# Patient Record
Sex: FEMALE | Race: WHITE
[De-identification: names, ages, dates, MRNs, and addresses within clinical notes are randomized per-mention and may not be internally consistent; named-entity substitution may affect disease eponyms.]

---

## 2018-08-18 ENCOUNTER — HOSPITAL ENCOUNTER (EMERGENCY)
Dept: HOSPITAL 45 - C.EDC | Age: 58
Discharge: HOME | End: 2018-08-18
Payer: COMMERCIAL

## 2018-08-18 VITALS — DIASTOLIC BLOOD PRESSURE: 103 MMHG | SYSTOLIC BLOOD PRESSURE: 158 MMHG | OXYGEN SATURATION: 99 % | HEART RATE: 68 BPM

## 2018-08-18 VITALS — OXYGEN SATURATION: 99 % | TEMPERATURE: 98.24 F

## 2018-08-18 DIAGNOSIS — E03.9: ICD-10-CM

## 2018-08-18 DIAGNOSIS — Z87.891: ICD-10-CM

## 2018-08-18 DIAGNOSIS — N28.9: ICD-10-CM

## 2018-08-18 DIAGNOSIS — M79.7: ICD-10-CM

## 2018-08-18 DIAGNOSIS — Z79.899: ICD-10-CM

## 2018-08-18 DIAGNOSIS — M06.9: ICD-10-CM

## 2018-08-18 DIAGNOSIS — I10: ICD-10-CM

## 2018-08-18 DIAGNOSIS — R55: Primary | ICD-10-CM

## 2018-08-18 LAB
ALBUMIN SERPL-MCNC: 3.9 GM/DL (ref 3.4–5)
ALP SERPL-CCNC: 123 U/L (ref 45–117)
ALT SERPL-CCNC: 33 U/L (ref 12–78)
AST SERPL-CCNC: 32 U/L (ref 15–37)
BASOPHILS # BLD: 0.03 K/UL (ref 0–0.2)
BASOPHILS NFR BLD: 0.5 %
BUN SERPL-MCNC: 16 MG/DL (ref 7–18)
BUN SERPL-MCNC: 18 MG/DL (ref 7–18)
CALCIUM SERPL-MCNC: 8.3 MG/DL (ref 8.5–10.1)
CALCIUM SERPL-MCNC: 9 MG/DL (ref 8.5–10.1)
CO2 SERPL-SCNC: 25 MMOL/L (ref 21–32)
CO2 SERPL-SCNC: 25 MMOL/L (ref 21–32)
CREAT SERPL-MCNC: 1.46 MG/DL (ref 0.6–1.2)
CREAT SERPL-MCNC: 2.34 MG/DL (ref 0.6–1.2)
EOS ABS #: 0.06 K/UL (ref 0–0.5)
EOSINOPHIL NFR BLD AUTO: 201 K/UL (ref 130–400)
GLUCOSE SERPL-MCNC: 102 MG/DL (ref 70–99)
GLUCOSE SERPL-MCNC: 181 MG/DL (ref 70–99)
HCT VFR BLD CALC: 39.2 % (ref 37–47)
HGB BLD-MCNC: 12.6 G/DL (ref 12–16)
IG#: 0.02 K/UL (ref 0–0.02)
IMM GRANULOCYTES NFR BLD AUTO: 21.2 %
INR PPP: 1 (ref 0.9–1.1)
LYMPHOCYTES # BLD: 1.4 K/UL (ref 1.2–3.4)
MCH RBC QN AUTO: 27.5 PG (ref 25–34)
MCHC RBC AUTO-ENTMCNC: 32.1 G/DL (ref 32–36)
MCV RBC AUTO: 85.4 FL (ref 80–100)
MONO ABS #: 0.34 K/UL (ref 0.11–0.59)
MONOCYTES NFR BLD: 5.2 %
NEUT ABS #: 4.75 K/UL (ref 1.4–6.5)
NEUTROPHILS # BLD AUTO: 0.9 %
NEUTROPHILS NFR BLD AUTO: 71.9 %
PMV BLD AUTO: 11.7 FL (ref 7.4–10.4)
POTASSIUM SERPL-SCNC: 4 MMOL/L (ref 3.5–5.1)
POTASSIUM SERPL-SCNC: 4.7 MMOL/L (ref 3.5–5.1)
PROT SERPL-MCNC: 8.3 GM/DL (ref 6.4–8.2)
PTT PATIENT: 24.8 SECONDS (ref 21–31)
RED CELL DISTRIBUTION WIDTH CV: 15.4 % (ref 11.5–14.5)
RED CELL DISTRIBUTION WIDTH SD: 47.6 FL (ref 36.4–46.3)
SODIUM SERPL-SCNC: 137 MMOL/L (ref 136–145)
SODIUM SERPL-SCNC: 140 MMOL/L (ref 136–145)
WBC # BLD AUTO: 6.6 K/UL (ref 4.8–10.8)

## 2018-08-18 NOTE — INTERNAL MEDICINE CONSULTATION
DATE OF CONSULTATION:  08/18/2018

 

IM ATTENDING :



Patient seen and examined at the request of Dr. Drake for evaluation of 
possible syncope.  

History obtained from the patient, , and records.

Preceding documentation by Miss Hope Campa PA-C reviewed.  





In addition, patient refuses to stay for observation/admission for evaluation 
of syncope.





FINAL ASSESSMENT AND RECOMMENDATIONS as follows :



Syncope likely secondary to orthostasis

ARF, clinical dehydration

rule out arrhythmia, obstructive cardiac pathology

given conduction blocks and hx nonobstructive CAD as per patient history 





PCU

telemetry monitoring

2D echo RE syncope

monitor creatinine to response to IVF

hold home ARB for now.  



Patient's unwillingness to stay for observation relayed to [place], Dr. Drake.

Final patient disposition as per Dr. Drake.



 

 

 

[facility]

## 2018-08-18 NOTE — HISTORY AND PHYSICAL
History & Physical


Date & Time of Service:


Aug 18, 2018 at 21:50


Chief Complaint:


Syncope


Primary Care Physician:


No Doctor, Assigned


History of Present Illness


Source:  patient, family, clinic records, hospital records


Pt is 57 y/o F with PMH HTN, hypothyroidism, RA, fibromyalgia, osteoarthritis 

presented to ER with complaint of near syncope.  Patient states today was at 

[facility] and was hot and sweating a lot all day.  She states she was walking 

when she became feeling increasingly hot, lightheaded, felt nauseated and felt 

like her vision was going black.  Patient reports that she was able to sit down 

on the ground and afterwards was starting to feel better.  Patient states was 

evaluated at first aid station.  Patient family member with her today and 

states that "maybe she passed out for 1 second" but is not sure. Patient states 

drink 2 bottles of water while there. She reports ate peanut butter and jelly 

sandwich for breakfast and eat pulled pork sandwich for lunch. Patient reports 

history of near syncope events in the past while attending a car showed during 

the summer. Patient denies any history of TIA/CVA, MI.  She reports history of 

cardiac cath and had 20% blockage of unknown artery.  Patient states had recent 

cardiac cath she believes within the past month and was told had less than 20% 

blockage. Denies fever/chills, diaphoresis, V/D/C, HA, neck pain, CP, SOB, 

orthopnea, palpitations, cough, sore throat, choking, otalgia, rhinorrhea, 

abdominal pain, paresthesias, extremity edema, rashes, urinary symptoms, weight 

loss.  Patient denies any known history kidney problems.





Patient states symptoms have completely resolved and she is anxious to go home 

if able.





Past Medical/Surgical History


Medical Problems:


(1) Fibromyalgia


Status: Chronic  





(2) HTN (hypertension)


Status: Chronic  





(3) Hypothyroidism


Status: Chronic  





(4) Rheumatoid arthritis


Status: Chronic  





Surgical Problems:


(1) History of hysterectomy


Status: Resolved  





(2) History of left knee replacement


Status: Resolved  








Family History





Diabetes mellitus


FH: cancer


Hypertension





Social History


Smoking Status:  Former Smoker (Quit 1994, had smoked 1 pack per day 10 years.)


Smokeless Tobacco Use:  No


Alcohol Use:  none


Drug Use:  none





Allergies


Coded Allergies:  


     No Known Allergies (Unverified , 8/18/18)





Home Medications


Scheduled


Cyanocobalamin (Vitamin B-12), MCG PO DAILY


Folic Acid (Folvite), 1 MG PO WK


Gabapentin (Neurontin), 300 MG PO TID


Losartan Potassium (Cozaar), Unknown Dose PO DAILY


Meloxicam (Mobic), Unknown Dose PO DAILY


Methotrexate (Methotrexate), 8 TBS PO WK


Sertraline (Zoloft), 25 MG PO DAILY





Review of Systems


See HPI for pertinent positives & negatives. All other systems reviewed and 

were otherwise negative





Physical Exam


Vital Signs











  Date Time  Temp Pulse Resp B/P (MAP) Pulse Ox O2 Delivery O2 Flow Rate FiO2


 


8/18/18 21:16  61 18 155/98 98 Room Air  


 


8/18/18 18:48  62 14 134/90 98 Room Air  


 


8/18/18 18:22  66  133/82    





  69  115/96    





  76  138/81    


 


8/18/18 17:37 36.8 78 20 133/81 98 Room Air  


 


8/18/18 17:37     99 Room Air  








General Appearance:  no apparent distress, + obese


Head:  normocephalic, atraumatic


Eyes:  normal inspection, PERRL, EOMI, sclerae normal


ENT:  hearing grossly normal, pharynx normal, + pertinent finding (Mucous 

membranes moist)


Neck:  supple, trachea midline


Respiratory/Chest:  lungs clear, normal breath sounds, no respiratory distress


Cardiovascular:  regular rate, rhythm


Abdomen/GI:  normal bowel sounds, non tender, soft


Back:  no CVA tenderness


Extremities/Musculoskelatal:  no calf tenderness, normal capillary refill, no 

pedal edema, normal range of motion


Neurologic/Psych:  alert, normal mood/affect, oriented x 3


Skin:  warm/dry





Diagnostics


Laboratory Results





Results Past 24 Hours








Test


  8/18/18


17:05 8/18/18


18:08 8/18/18


18:09 8/18/18


20:04 Range/Units


 


 


White Blood Count 6.60    4.8-10.8  K/uL


 


Red Blood Count 4.59    4.2-5.4  M/uL


 


Hemoglobin 12.6    12.0-16.0  g/dL


 


Hematocrit 39.2    37-47  %


 


Mean Corpuscular Volume 85.4      fL


 


Mean Corpuscular Hemoglobin 27.5    25-34  pg


 


Mean Corpuscular Hemoglobin


Concent 32.1


  


  


  


  32-36  g/dl


 


 


Platelet Count 201    130-400  K/uL


 


Mean Platelet Volume 11.7    7.4-10.4  fL


 


Neutrophils (%) (Auto) 71.9     %


 


Lymphocytes (%) (Auto) 21.2     %


 


Monocytes (%) (Auto) 5.2     %


 


Eosinophils (%) (Auto) 0.9     %


 


Basophils (%) (Auto) 0.5     %


 


Neutrophils # (Auto) 4.75    1.4-6.5  K/uL


 


Lymphocytes # (Auto) 1.40    1.2-3.4  K/uL


 


Monocytes # (Auto) 0.34    0.11-0.59  K/uL


 


Eosinophils # (Auto) 0.06    0-0.5  K/uL


 


Basophils # (Auto) 0.03    0-0.2  K/uL


 


RDW Standard Deviation 47.6    36.4-46.3  fL


 


RDW Coefficient of Variation 15.4    11.5-14.5  %


 


Immature Granulocyte % (Auto) 0.3     %


 


Immature Granulocyte # (Auto) 0.02    0.00-0.02  K/uL


 


Prothrombin Time


  10.2


  


  


  


  9.0-12.0


SECONDS


 


Prothromb Time International


Ratio 1.0


  


  


  


  0.9-1.1  


 


 


Activated Partial


Thromboplast Time 24.8


  


  


  


  21.0-31.0


SECONDS


 


Partial Thromboplastin Ratio 1.0     


 


Sodium Level 137    136-145  mmol/L


 


Potassium Level 4.0    3.5-5.1  mmol/L


 


Chloride Level 102      mmol/L


 


Carbon Dioxide Level 25    21-32  mmol/L


 


Anion Gap 10.0    3-11  mmol/L


 


Blood Urea Nitrogen 18    7-18  mg/dl


 


Creatinine


  2.34


  


  


  


  0.60-1.20


mg/dl


 


Estimated GFR (


American) 25.7


  


  


  


   


 


 


Estimated GFR (Non-


American 22.2


  


  


  


   


 


 


BUN/Creatinine Ratio 7.5    10-20  


 


Random Glucose 181    70-99  mg/dl


 


Calcium Level 9.0    8.5-10.1  mg/dl


 


Total Bilirubin 0.6    0.2-1  mg/dl


 


Direct Bilirubin 0.1    0-0.2  mg/dl


 


Aspartate Amino Transf


(AST/SGOT) 32


  


  


  


  15-37  U/L


 


 


Alanine Aminotransferase


(ALT/SGPT) 33


  


  


  


  12-78  U/L


 


 


Alkaline Phosphatase 123      U/L


 


Troponin I < 0.015   < 0.015 0-0.045  ng/ml


 


Total Protein 8.3    6.4-8.2  gm/dl


 


Albumin 3.9    3.4-5.0  gm/dl


 


Thyroid Stimulating Hormone


(TSH) 0.964


  


  


  


  0.300-4.500


uIu/ml


 


Urine Color  DK YELLOW    


 


Urine Appearance  CLEAR   CLEAR  


 


Urine pH  6.0   4.5-7.5  


 


Urine Specific Gravity  1.022   1.000-1.030  


 


Urine Protein  TRACE   NEG  


 


Urine Glucose (UA)  NEG   NEG  


 


Urine Ketones  TRACE   NEG  


 


Urine Occult Blood  NEG   NEG  


 


Urine Nitrite  NEG   NEG  


 


Urine Bilirubin  NEG   NEG  


 


Urine Urobilinogen  NEG   NEG  


 


Urine Leukocyte Esterase  SMALL   NEG  


 


Urine WBC (Auto)  5-10   0-5  /hpf


 


Urine RBC (Auto)  5-10   0-4  /hpf


 


Urine Hyaline Casts (Auto)  >30   0-5  /lpf


 


Urine Epithelial Cells (Auto)  >30   0-5  /lpf


 


Urine Bacteria (Auto)  NEG   NEG  


 


Urine Renal Epithelial Cells  0-5   0-5  /lpf


 


Urine Crystals  CALCIUM OXALATE   NONE PRSENT  


 


Urine Pathogenic Casts     0  /lpf


 


Bedside Glucose   93  70-90  mg/dl


 


Test


  8/18/18


21:27 


  


  


  Range/Units


 








Microbiology Results


8/18/18 Urine Culture, Received


          Pending





Diagnostic Radiology


CXR:


IMPRESSION:


1.  No acute cardiopulmonary disease.





CT head:


IMPRESSION:


1.  No acute intracranial abnormality.





EKG


EKG: Sinus rhythm, first-degree AV block, RBBB





Impression


Assessment and Plan


Pt is 57 y/o F with PMH HTN, hypothyroidism, RA, fibromyalgia, osteoarthritis 

presented to ER with complaint of near syncope. Pt was at [place] and was 

hot and sweating a lot all day.  She states she was walking when she became 

increasingly hot, lightheaded, felt nauseated and felt like her vision was 

going black.  Patient reports that she was able to sit down on the ground and 

afterwards was starting to feel better.  Patient family member with her today 

and states that "maybe she passed out for 1 second" but is not sure





NEAR-SYNCOPE


POSSIBLE SYNCOPE


Probable vasovagal


Denies CP/SOB/palpitations


In ER patient afebrile, pulse 78, /81, 98% on room air.  No leukocytosis.

  Hgb: 12.6.  Negative initial troponin and negative troponin 3 hours after.  UA

: Trace ketones, sm leuk, 5-10 WBC and RBC,> 30 hyaline casts, > epithelial


-Given 1 L NSS in ER


-Patient reports feeling much better


-Pending urine culture





RENAL INSUFFICIENCY


Cr: 2.34.  GFR: 22.  Unsure of patient's baseline renal functions


-Monitor renal functions


-Avoid nephrotoxic agents when able





HTN


Patient on losartan, reports taking this morning





HYPOTHYROIDISM


TSH: 0.9


-Patient reports is on thyroid medicine, unsure of name and dose.  Unable to 

call pharmacy at this time secondary to pharmacy being closed





RHEUMATOID ARTHRITIS


-On methotrexate, Mobic





FIBROMYALGIA


-On gabapentin, Zoloft





Patient reports PCP was Dr. Quintero in [place], however reports retired and hasn

't seen new PCP yet





-Repeat renal function pending at this time.  Patient reports is feeling much 

better and would really like to be discharged home tonight if her renal 

functions are improving however does report that she would be willing to stay 

if appropriate.





Patient seen with Dr. Mann.  Please see attending addendum for further 

assessment and plan and disposition.





Resuscitation Status








VTE Prophylaxis


Will order VTE Prophylaxis:  Yes





Assessment/Plan


IM ATTENDING :





Patient seen and examined at the request of Dr. Drake for evaluation of 

possible syncope.  


History obtained from the patient, , and records.


Preceding documentation by Miss Hope Campa PA-C reviewed.  








In addition, patient refuses to stay for observation/admission for evaluation 

of syncope.








FINAL ASSESSMENT AND RECOMMENDATIONS as follows :





Syncope likely secondary to orthostasis


ARF, clinical dehydration


rule out arrhythmia, obstructive cardiac pathology


given conduction delay (1AVB, RBBB) on EKG and hx nonobstructive CAD as per 

patient history 








PCU


telemetry monitoring


2D echo RE syncope


monitor creatinine to response to IVF


hold home ARB for now.  





Patient's unwillingness to stay for observation relayed to Dr. Vidal CUEVAS.


Final patient disposition as per Dr. Drake.

## 2018-08-18 NOTE — DIAGNOSTIC IMAGING REPORT
CHEST ONE VIEW PORTABLE



CLINICAL HISTORY: 58 years-old Female presenting with EVALUATE ALTERED MENTAL

STATUS/WEAKNESS. 



TECHNIQUE: Portable upright AP view of the chest was obtained.



COMPARISON: None.



FINDINGS:

Cardiomediastinal silhouette normal. Lungs and pleural spaces clear. Osseous

structures normal. Upper abdomen normal.



IMPRESSION:

1.  No acute cardiopulmonary disease.







Electronically signed by:  Phil Barnes M.D.

8/18/2018 6:15 PM



Dictated Date/Time:  8/18/2018 6:15 PM

## 2018-08-18 NOTE — EMERGENCY ROOM VISIT NOTE
History


Report prepared by Kali:  Kathleen Barajas


Under the Supervision of:  Dr. Félix Drake D.O.


First contact with patient:  17:38


Stated Complaint:  SYNCOPE





History of Present Illness


The patient is a 58 year old female who presents to the Emergency Room with 

complaints of an episode of near syncope occurring prior to arrival. The 

patient states that she was at the [employer] looking at the show animals when 

she started to feel lightheaded. She states that she started sweating, becoming 

pale, and her vision started to get dark. She reports that she felt like she 

was going to pass out so she sat down on the ground. She states that she had an 

episode of nausea that almost immediately went away. She reports that her 

daughter got help and she was taken to the first aid station. The patient 

states that this has happened once before when she was out in the heat. She 

states that she tends to get dehydrated easily, but notes that she had drank 2 

bottles of water while she was there. The patient notes that she feels back to 

normal. The patient denies vomiting, abdominal pain, chest pain, shortness of 

breath, weakness, numbness, loss of consciousness, hitting her head, recent 

trips, and history of blood clots.





   Source of History:  patient


   Onset:  prior to arrival


   Position:  head


   Quality:  other (near syncope)


   Timing:  other (episode)


   Associated Symptoms:  + diaphoresis, + nausea, No LOC, No chest pain, No SOB

, No vomiting, No weakness, No numbness


Note:


The patient complains of lightheadedness, changes in vision, and becoming pale. 

The patient denies hitting her head and recent trips.





Review of Systems


See HPI for pertinent positives & negatives. A total of 10 systems reviewed and 

were otherwise negative.





Past Medical & Surgical


Medical Problems:


(1) Fibromyalgia


(2) HTN (hypertension)


(3) Hypothyroidism


(4) Rheumatoid arthritis


Surgical Problems:


(1) History of hysterectomy


(2) History of left knee replacement


(3) Hx of hysterectomy








Family History





Diabetes mellitus


Hypertension





Social History


Marital Status:  single


Housing Status:  lives with family





Current/Historical Medications


Scheduled


Cyanocobalamin (Vitamin B-12), MCG PO DAILY


Folic Acid (Folvite), 1 MG PO WK


Gabapentin (Neurontin), 300 MG PO TID


Losartan Potassium (Cozaar), Unknown Dose PO DAILY


Meloxicam (Mobic), Unknown Dose PO DAILY


Methotrexate (Methotrexate), 8 TBS PO WK


Sertraline (Zoloft), 25 MG PO DAILY





Allergies


Coded Allergies:  


     No Known Allergies (Unverified , 8/18/18)





Physical Exam


Vital Signs











  Date Time  Temp Pulse Resp B/P (MAP) Pulse Ox O2 Delivery O2 Flow Rate FiO2


 


8/18/18 22:35  68 18 158/103 99   


 


8/18/18 21:16  61 18 155/98 98 Room Air  


 


8/18/18 18:48  62 14 134/90 98 Room Air  


 


8/18/18 18:22  66  133/82    





  69  115/96    





  76  138/81    


 


8/18/18 17:37 36.8 78 20 133/81 98 Room Air  


 


8/18/18 17:37     99 Room Air  











Physical Exam


GENERAL: Sitting up in bed, alert, well appearing, well nourished, no distress, 

non-toxic 


EYE EXAM: normal conjunctiva. 


OROPHARYNX: no exudate, no erythema, lips, buccal mucosa, and tongue normal and 

mucous membranes are moist


NECK: supple, no nuchal rigidity, no adenopathy, non-tender


LUNGS: Clear to auscultation. Normal chest wall mechanics


HEART: no murmurs, S1 normal and S2 normal 


ABDOMEN: abdomen soft, non-tender, normo-active bowel sounds, no masses, no 

rebound or guarding. 


BACK: Back is symmetrical on inspection and there is no deformity, no midline 

tenderness, no CVA tenderness. 


SKIN: no rashes and no bruising 


UPPER EXTREMITIES: upper extremities are grossly normal. 


LOWER EXTREMITIES: No pitting edema. 


NEURO EXAM: Normal sensorium, cranial nerves II-XII intact, normal speech,  no 

weakness of arms, no weakness of legs. No drift. Finger to nose intact. Gross 

sensation intact.





Medical Decision & Procedures


ER Provider


Diagnostic Interpretation:


Radiology results as stated below per my review and the radiologist's 

interpretation: 





CHEST ONE VIEW PORTABLE





CLINICAL HISTORY: 58 years-old Female presenting with EVALUATE ALTERED MENTAL


STATUS/WEAKNESS. 





TECHNIQUE: Portable upright AP view of the chest was obtained.





COMPARISON: None.





FINDINGS:


Cardiomediastinal silhouette normal. Lungs and pleural spaces clear. Osseous


structures normal. Upper abdomen normal.





IMPRESSION:


1.  No acute cardiopulmonary disease.











Electronically signed by:  Phil Barnes M.D.


8/18/2018 6:15 PM





Dictated Date/Time:  8/18/2018 6:15 PM





HEAD WITHOUT CONTRAST (CT)





CLINICAL HISTORY: 58 years-old Female presenting with dizzy/near syncope. 





TECHNIQUE: Multidetector CT imaging of the head was performed without the use of


intravenous contrast. IV contrast: None. A dose lowering technique was used


consistent with the principles of ALARA (as low as reasonably achievable). 





COMPARISON: None.





CT DOSE (mGy.cm): The estimated cumulative dose is 537.48 mGy.cm.





FINDINGS: 





 topogram: Unremarkable.





Ventricles and sulci normal in size. Brain parenchyma normal in appearance with


preserved gray-white differentiation. No mass effect or midline shift. No


hemorrhage or acute territorial infarct. No extra-axial fluid collection.


Paranasal sinuses and mastoid air cells clear. Calvarium intact.    





IMPRESSION:


1.  No acute intracranial abnormality. 











Electronically signed by:  Phil Barnes M.D.


8/18/2018 6:41 PM





Dictated Date/Time:  8/18/2018 6:39 PM





Laboratory Results


8/18/18 17:05








Red Blood Count 4.59, Mean Corpuscular Volume 85.4, Mean Corpuscular Hemoglobin 

27.5, Mean Corpuscular Hemoglobin Concent 32.1, Mean Platelet Volume 11.7, 

Neutrophils (%) (Auto) 71.9, Lymphocytes (%) (Auto) 21.2, Monocytes (%) (Auto) 

5.2, Eosinophils (%) (Auto) 0.9, Basophils (%) (Auto) 0.5, Neutrophils # (Auto) 

4.75, Lymphocytes # (Auto) 1.40, Monocytes # (Auto) 0.34, Eosinophils # (Auto) 

0.06, Basophils # (Auto) 0.03





8/18/18 21:27

















Test


  8/18/18


17:05 8/18/18


18:08 8/18/18


18:09 8/18/18


20:04


 


White Blood Count


  6.60 K/uL


(4.8-10.8) 


  


  


 


 


Red Blood Count


  4.59 M/uL


(4.2-5.4) 


  


  


 


 


Hemoglobin


  12.6 g/dL


(12.0-16.0) 


  


  


 


 


Hematocrit 39.2 % (37-47)    


 


Mean Corpuscular Volume


  85.4 fL


() 


  


  


 


 


Mean Corpuscular Hemoglobin


  27.5 pg


(25-34) 


  


  


 


 


Mean Corpuscular Hemoglobin


Concent 32.1 g/dl


(32-36) 


  


  


 


 


Platelet Count


  201 K/uL


(130-400) 


  


  


 


 


Mean Platelet Volume


  11.7 fL


(7.4-10.4) 


  


  


 


 


Neutrophils (%) (Auto) 71.9 %    


 


Lymphocytes (%) (Auto) 21.2 %    


 


Monocytes (%) (Auto) 5.2 %    


 


Eosinophils (%) (Auto) 0.9 %    


 


Basophils (%) (Auto) 0.5 %    


 


Neutrophils # (Auto)


  4.75 K/uL


(1.4-6.5) 


  


  


 


 


Lymphocytes # (Auto)


  1.40 K/uL


(1.2-3.4) 


  


  


 


 


Monocytes # (Auto)


  0.34 K/uL


(0.11-0.59) 


  


  


 


 


Eosinophils # (Auto)


  0.06 K/uL


(0-0.5) 


  


  


 


 


Basophils # (Auto)


  0.03 K/uL


(0-0.2) 


  


  


 


 


RDW Standard Deviation


  47.6 fL


(36.4-46.3) 


  


  


 


 


RDW Coefficient of Variation


  15.4 %


(11.5-14.5) 


  


  


 


 


Immature Granulocyte % (Auto) 0.3 %    


 


Immature Granulocyte # (Auto)


  0.02 K/uL


(0.00-0.02) 


  


  


 


 


Prothrombin Time


  10.2 SECONDS


(9.0-12.0) 


  


  


 


 


Prothromb Time International


Ratio 1.0 (0.9-1.1) 


  


  


  


 


 


Activated Partial


Thromboplast Time 24.8 SECONDS


(21.0-31.0) 


  


  


 


 


Partial Thromboplastin Ratio 1.0    


 


Total Bilirubin


  0.6 mg/dl


(0.2-1) 


  


  


 


 


Direct Bilirubin


  0.1 mg/dl


(0-0.2) 


  


  


 


 


Aspartate Amino Transf


(AST/SGOT) 32 U/L (15-37) 


  


  


  


 


 


Alanine Aminotransferase


(ALT/SGPT) 33 U/L (12-78) 


  


  


  


 


 


Alkaline Phosphatase


  123 U/L


() 


  


  


 


 


Total Protein


  8.3 gm/dl


(6.4-8.2) 


  


  


 


 


Albumin


  3.9 gm/dl


(3.4-5.0) 


  


  


 


 


Thyroid Stimulating Hormone


(TSH) 0.964 uIu/ml


(0.300-4.500) 


  


  


 


 


Urine Color  DK YELLOW   


 


Urine Appearance  CLEAR (CLEAR)   


 


Urine pH  6.0 (4.5-7.5)   


 


Urine Specific Gravity


  


  1.022


(1.000-1.030) 


  


 


 


Urine Protein  TRACE (NEG)   


 


Urine Glucose (UA)  NEG (NEG)   


 


Urine Ketones  TRACE (NEG)   


 


Urine Occult Blood  NEG (NEG)   


 


Urine Nitrite  NEG (NEG)   


 


Urine Bilirubin  NEG (NEG)   


 


Urine Urobilinogen  NEG (NEG)   


 


Urine Leukocyte Esterase  SMALL (NEG)   


 


Urine WBC (Auto)


  


  5-10 /hpf


(0-5) 


  


 


 


Urine RBC (Auto)


  


  5-10 /hpf


(0-4) 


  


 


 


Urine Hyaline Casts (Auto)  >30 /lpf (0-5)   


 


Urine Epithelial Cells (Auto)  >30 /lpf (0-5)   


 


Urine Bacteria (Auto)  NEG (NEG)   


 


Urine Renal Epithelial Cells  0-5 /lpf (0-5)   


 


Urine Crystals


  


  CALCIUM


OXALATE (NONE 


  


 


 


Urine Pathogenic Casts   /lpf (0)   


 


Bedside Glucose


  


  


  93 mg/dl


(70-90) 


 


 


Troponin I


  


  


  


  < 0.015 ng/ml


(0-0.045)


 


Test


  8/18/18


21:27 


  


  


 


 


Anion Gap


  5.0 mmol/L


(3-11) 


  


  


 


 


Estimated GFR (


American) 45.5 


  


  


  


 


 


Estimated GFR (Non-


American 39.3 


  


  


  


 


 


BUN/Creatinine Ratio 11.0 (10-20)    


 


Calcium Level


  8.3 mg/dl


(8.5-10.1) 


  


  


 


 


Magnesium Level


  2.3 mg/dl


(1.8-2.4) 


  


  


 





Laboratory results per my review.





Medications Administered











 Medications


  (Trade)  Dose


 Ordered  Sig/Vinny


 Route  Start Time


 Stop Time Status Last Admin


Dose Admin


 


 Sodium Chloride  1,000 ml @ 


 999 mls/hr  Q1H1M STAT


 IV  8/18/18 17:46


 8/18/18 18:46 DC 8/18/18 17:46


999 MLS/HR











ECG Per My Interpretation


Indication:  syncope


Rate (beats per minute):  69


Rhythm:  sinus rhythm


Findings:  1st degree AV block, RBBB, T-wave inversion (Septal, Anterior, 

Inferior)


Comparison ECG Date:  no prior available





ED Course


ED COURSE: 


Vital signs were reviewed and showed that they were normal.


The patients medical record was reviewed


The above diagnostic studies were performed and reviewed.


ED treatments and interventions as stated above. 





1740: The patient was evaluated in room C6. A complete history and physical 

examination was performed.





1746: Ordered NSS 1000 ml @ 999 mls/hr IV. 





1811: I reevaluated the patient and she is doing well.





1950: I reevaluated the patient and she is doing well. She feels better. She is 

up moving around and eating.





2104: Upon reevaluation, the patient is resting comfortably. I discussed my 

findings with the patient and she understands and agrees with the treatment 

plan.   


Based on the patients age, coexisting illnesses, exam and lab findings the 

decision to treat as an inpatient was made.


The patient remained stable while under my care.


The patient will be evaluated for further management.





2122: I reviewed the patient's case with Dr. Qasim Hernandez Hospitalist.  He 

will evaluate the patient for further management.





2212: I reevalauted the patient and she decided that she would not like to stay 

for further evaluation. I explained the risks and benefits to the patient. She 

verbally expressed understanding. The patient is signing out AMA.





Medical Decision


Differential diagnosis includes etiologies such as vasovagal event, infection, 

hypoglycemia, electrolyte abnormalities, cardiac sources, intracerebral event, 

toxicologic, neurologic, as well as others were entertained.





Patient is a 58-year-old female that presents the ER for near syncopal episode 

at the [facility].  She notes that she has not been drinking as much fluids as 

she normally does.  Patient has no other complaints.  She denies any chest pain 

or shortness of breath.  She is neurologically intact.  CBC was unremarkable.  

BMP with creatinine 2.3.  Troponin was negative.  LFTs and bilirubin were 

normal.  EKG showed right bundle with first-degree AV block.  With the syncopal 

events, and elevated creatinine at 2.3 did recommend admission.  Patient was 

initially agreeable.  Discussed with Dr. Ji.  Dr. Ji evaluate the 

patient.  He declined admission.  She request to go home.  Risk and benefits 

were explained at length.   was at bedside and notes that he cannot make 

her stay.  She notes she wants to go home sleep in her own bed and getting good 

progress.  She does not believe that this is her heart.  Kidney function did 

improve with BMP.  Patient was discharged following informed refusal of care to 

follow-up with PCP as an outpatient.  I again strongly encouraged her to return 

to the ER if anything changes or worsens. Discussed with Pt concerning signs 

and symptoms to watch out for. Pt was instructed to follow up with their PCP 

and discussed with the patient their option to return to the ED at anytime for 

persistent or worsening symptoms. The appropriate anticipatory guidance and out-

patient management, including indications for return to the emergency department

, were explained at length to the patient and understood.





Medication Reconcilliation


Current Medication List:  was personally reviewed by me





Blood Pressure Screening


Patient's blood pressure:  Normal blood pressure


Blood pressure disposition:  Did not require urgent referral





Consults


Time Called:  2118


Consulting Physician:  Dr. Qasim Hernandez Hospitalist


Returned Call:  2122


I reviewed the patient's case with Dr. Qasim Hernandez Hospitalist.  He will 

evaluate the patient for further management.





Impression





 Primary Impression:  


 JUAN (acute kidney injury)


 Additional Impressions:  


 Dehydration


 Near syncope


 Acute electrocardiogram changes





Scribe Attestation


The scribe's documentation has been prepared under my direction and personally 

reviewed by me in its entirety. I confirm that the note above accurately 

reflects all work, treatment, procedures, and medical decision making performed 

by me.





Departure Information


Dispostion


Against Medical Advice





Forms


HOME CARE DOCUMENTATION FORM,                                                 

               IMPORTANT VISIT INFORMATION





Additional Instructions





Please follow up with your primary care doctor with in the next 24 hours.  Any 

worsening of your symptoms, please return to the ED immediately. This includes 

any fevers greater than 100.4, worsening pain, chest pain, shortness breath, 

persistent nausea, vomiting, unable to eat or drink, or any other concerning 

signs or symptoms from your standpoint.





You must follow-up with your primary care doctor in the next 24-48 hours for 

repeat blood work in regards to your kidney function as it was elevated in the 

ER.





You should also follow-up with your primary care doctor in regards to the 

syncope/near syncope in regards to your EKG findings.





Problem Qualifiers

## 2018-08-18 NOTE — MEDICAL CONSULT
Consultation Note


Date of Service


Aug 18, 2018.





Consultation Note


[place] [address]


 


 INTERNAL MEDICINE CONSULTATION








Patient Name:  JAZMINE RIZO Admit Date:  08/18/18


Med Rec:  [id number] Att Phy:  


Acct ID:  [id number] Melissa Phy:  No Doctor, Assigned


YOB: 1960 Fam Phy:  No Doctor, Assigned


Age:  58 Location:  [facility]


Sex:  F Room/Bed: 





CC:  No Doctor, Assigned


Dev Mann M.D. Schreckengost, Janea ., SHRUTHI


*NOTICE TO RECEIVING PARTY/AGENCY**  This information is strictly Confidential 

and protected under [place] law.  [place] law prohibits you from 

making any further disclosure of this information unless further disclosure is 

expressly permitted by the written consent of the person to whom it pertains or 

is authorized by law.  A general authorization for the release of medical or 

other information is not sufficient for this purpose.  Hospital accepts no 

responsibility if the information is made available to any other person, 

INCLUDING THE PATIENT.


History & Physical


Date & Time of Service:


Aug 18, 2018 at 21:50


Chief Complaint:


Syncope


Primary Care Physician:


No Doctor, Assigned





Patient seen on the request of Dr. Drake for evaluation of possible syncope.  





History of Present Illness


Source:  patient, family, clinic records, hospital records


Pt is 59 y/o F with PMH HTN, hypothyroidism, RA, fibromyalgia, osteoarthritis 

presented to ER with complaint of near syncope.  Patient states today was at 

[facility] and was hot and sweating a lot all day.  She states she was walking 

when she became feeling increasingly hot, lightheaded, felt nauseated and felt 

like her vision was going black.  Patient reports that she was able to sit down 

on the ground and afterwards was starting to feel better.  Patient states was 

evaluated at first aid station.  Patient family member with her today and 

states that "maybe she passed out for 1 second" but is not sure. Patient states 

drink 2 bottles of water while there. She reports ate peanut butter and jelly 

sandwich for breakfast and eat pulled pork sandwich for lunch. Patient reports 

history of near syncope events in the past while attending a car showed during 

the summer. Patient denies any history of TIA/CVA, MI.  She reports history of 

cardiac cath and had 20% blockage of unknown artery.  Patient states had recent 

cardiac cath she believes within the past month and was told had less than 20% 

blockage. Denies fever/chills, diaphoresis, V/D/C, HA, neck pain, CP, SOB, 

orthopnea, palpitations, cough, sore throat, choking, otalgia, rhinorrhea, 

abdominal pain, paresthesias, extremity edema, rashes, urinary symptoms, weight 

loss.  Patient denies any known history kidney problems.





Patient states symptoms have completely resolved and she is anxious to go home 

if able.





Past Medical/Surgical History


Medical Problems:


(1) Fibromyalgia


Status: Chronic  





(2) HTN (hypertension)


Status: Chronic  





(3) Hypothyroidism


Status: Chronic  





(4) Rheumatoid arthritis


Status: Chronic  





Surgical Problems:


(1) History of hysterectomy


Status: Resolved  





(2) History of left knee replacement


Status: Resolved  








Family History





Diabetes mellitus


FH: cancer


Hypertension





Social History


Smoking Status:  Former Smoker (Quit 1994, had smoked 1 pack per day 10 years.)


Smokeless Tobacco Use:  No


Alcohol Use:  none


Drug Use:  none





Allergies


Coded Allergies:  


     No Known Allergies (Unverified , 8/18/18)





Home Medications


Scheduled


Cyanocobalamin (Vitamin B-12), MCG PO DAILY


Folic Acid (Folvite), 1 MG PO WK


Gabapentin (Neurontin), 300 MG PO TID


Losartan Potassium (Cozaar), Unknown Dose PO DAILY


Meloxicam (Mobic), Unknown Dose PO DAILY


Methotrexate (Methotrexate), 8 TBS PO WK


Sertraline (Zoloft), 25 MG PO DAILY





Review of Systems


See HPI for pertinent positives & negatives. All other systems reviewed and 

were otherwise negative





Physical Exam


Vital Signs











  Date Time  Temp Pulse Resp B/P (MAP) Pulse Ox O2 Delivery O2 Flow Rate FiO2


 


8/18/18 21:16  61 18 155/98 98 Room Air  


 


8/18/18 18:48  62 14 134/90 98 Room Air  


 


8/18/18 18:22  66  133/82    





  69  115/96    





  76  138/81    


 


8/18/18 17:37 36.8 78 20 133/81 98 Room Air  


 


8/18/18 17:37     99 Room Air  








General Appearance:  no apparent distress, + obese


Head:  normocephalic, atraumatic


Eyes:  normal inspection, PERRL, EOMI, sclerae normal


ENT:  hearing grossly normal, pharynx normal, + pertinent finding (Mucous 

membranes moist)


Neck:  supple, trachea midline


Respiratory/Chest:  lungs clear, normal breath sounds, no respiratory distress


Cardiovascular:  regular rate, rhythm


Abdomen/GI:  normal bowel sounds, non tender, soft


Back:  no CVA tenderness


Extremities/Musculoskelatal:  no calf tenderness, normal capillary refill, no 

pedal edema, normal range of motion


Neurologic/Psych:  alert, normal mood/affect, oriented x 3


Skin:  warm/dry





Diagnostics


Laboratory Results





Results Past 24 Hours








Test


  8/18/18


17:05 8/18/18


18:08 8/18/18


18:09 8/18/18


20:04 Range/Units


 


 


White Blood Count 6.60    4.8-10.8  K/uL


 


Red Blood Count 4.59    4.2-5.4  M/uL


 


Hemoglobin 12.6    12.0-16.0  g/dL


 


Hematocrit 39.2    37-47  %


 


Mean Corpuscular Volume 85.4      fL


 


Mean Corpuscular Hemoglobin 27.5    25-34  pg


 


Mean Corpuscular Hemoglobin


Concent 32.1


  


  


  


  32-36  g/dl


 


 


Platelet Count 201    130-400  K/uL


 


Mean Platelet Volume 11.7    7.4-10.4  fL


 


Neutrophils (%) (Auto) 71.9     %


 


Lymphocytes (%) (Auto) 21.2     %


 


Monocytes (%) (Auto) 5.2     %


 


Eosinophils (%) (Auto) 0.9     %


 


Basophils (%) (Auto) 0.5     %


 


Neutrophils # (Auto) 4.75    1.4-6.5  K/uL


 


Lymphocytes # (Auto) 1.40    1.2-3.4  K/uL


 


Monocytes # (Auto) 0.34    0.11-0.59  K/uL


 


Eosinophils # (Auto) 0.06    0-0.5  K/uL


 


Basophils # (Auto) 0.03    0-0.2  K/uL


 


RDW Standard Deviation 47.6    36.4-46.3  fL


 


RDW Coefficient of Variation 15.4    11.5-14.5  %


 


Immature Granulocyte % (Auto) 0.3     %


 


Immature Granulocyte # (Auto) 0.02    0.00-0.02  K/uL


 


Prothrombin Time


  10.2


  


  


  


  9.0-12.0


SECONDS


 


Prothromb Time International


Ratio 1.0


  


  


  


  0.9-1.1  


 


 


Activated Partial


Thromboplast Time 24.8


  


  


  


  21.0-31.0


SECONDS


 


Partial Thromboplastin Ratio 1.0     


 


Sodium Level 137    136-145  mmol/L


 


Potassium Level 4.0    3.5-5.1  mmol/L


 


Chloride Level 102      mmol/L


 


Carbon Dioxide Level 25    21-32  mmol/L


 


Anion Gap 10.0    3-11  mmol/L


 


Blood Urea Nitrogen 18    7-18  mg/dl


 


Creatinine


  2.34


  


  


  


  0.60-1.20


mg/dl


 


Estimated GFR (


American) 25.7


  


  


  


   


 


 


Estimated GFR (Non-


American 22.2


  


  


  


   


 


 


BUN/Creatinine Ratio 7.5    10-20  


 


Random Glucose 181    70-99  mg/dl


 


Calcium Level 9.0    8.5-10.1  mg/dl


 


Total Bilirubin 0.6    0.2-1  mg/dl


 


Direct Bilirubin 0.1    0-0.2  mg/dl


 


Aspartate Amino Transf


(AST/SGOT) 32


  


  


  


  15-37  U/L


 


 


Alanine Aminotransferase


(ALT/SGPT) 33


  


  


  


  12-78  U/L


 


 


Alkaline Phosphatase 123      U/L


 


Troponin I < 0.015   < 0.015 0-0.045  ng/ml


 


Total Protein 8.3    6.4-8.2  gm/dl


 


Albumin 3.9    3.4-5.0  gm/dl


 


Thyroid Stimulating Hormone


(TSH) 0.964


  


  


  


  0.300-4.500


uIu/ml


 


Urine Color  DK YELLOW    


 


Urine Appearance  CLEAR   CLEAR  


 


Urine pH  6.0   4.5-7.5  


 


Urine Specific Gravity  1.022   1.000-1.030  


 


Urine Protein  TRACE   NEG  


 


Urine Glucose (UA)  NEG   NEG  


 


Urine Ketones  TRACE   NEG  


 


Urine Occult Blood  NEG   NEG  


 


Urine Nitrite  NEG   NEG  


 


Urine Bilirubin  NEG   NEG  


 


Urine Urobilinogen  NEG   NEG  


 


Urine Leukocyte Esterase  SMALL   NEG  


 


Urine WBC (Auto)  5-10   0-5  /hpf


 


Urine RBC (Auto)  5-10   0-4  /hpf


 


Urine Hyaline Casts (Auto)  >30   0-5  /lpf


 


Urine Epithelial Cells (Auto)  >30   0-5  /lpf


 


Urine Bacteria (Auto)  NEG   NEG  


 


Urine Renal Epithelial Cells  0-5   0-5  /lpf


 


Urine Crystals  CALCIUM OXALATE   NONE PRSENT  


 


Urine Pathogenic Casts     0  /lpf


 


Bedside Glucose   93  70-90  mg/dl


 


Test


  8/18/18


21:27 


  


  


  Range/Units


 








Microbiology Results


8/18/18 Urine Culture, Received


          Pending





Diagnostic Radiology


CXR:


IMPRESSION:


1.  No acute cardiopulmonary disease.





CT head:


IMPRESSION:


1.  No acute intracranial abnormality.





EKG


EKG: Sinus rhythm, first-degree AV block, RBBB





Impression


Assessment and Plan


Pt is 59 y/o F with PMH HTN, hypothyroidism, RA, fibromyalgia, osteoarthritis 

presented to ER with complaint of near syncope. Pt was at [place] and was 

hot and sweating a lot all day.  She states she was walking when she became 

increasingly hot, lightheaded, felt nauseated and felt like her vision was 

going black.  Patient reports that she was able to sit down on the ground and 

afterwards was starting to feel better.  Patient family member with her today 

and states that "maybe she passed out for 1 second" but is not sure





NEAR-SYNCOPE


POSSIBLE SYNCOPE


Probable vasovagal


Denies CP/SOB/palpitations


In ER patient afebrile, pulse 78, /81, 98% on room air.  No leukocytosis.

  Hgb: 12.6.  Negative initial troponin and negative troponin 3 hours after.  UA

: Trace ketones, sm leuk, 5-10 WBC and RBC,> 30 hyaline casts, > epithelial


-Given 1 L NSS in ER


-Patient reports feeling much better


-Pending urine culture





RENAL INSUFFICIENCY


Cr: 2.34.  GFR: 22.  Unsure of patient's baseline renal functions


-Monitor renal functions


-Avoid nephrotoxic agents when able





HTN


Patient on losartan, reports taking this morning





HYPOTHYROIDISM


TSH: 0.9


-Patient reports is on thyroid medicine, unsure of name and dose.  Unable to 

call pharmacy at this time secondary to pharmacy being closed





RHEUMATOID ARTHRITIS


-On methotrexate, Mobic





FIBROMYALGIA


-On gabapentin, Zoloft





Patient reports PCP was Dr. Quintero in [place], however reports retired and hasn

't seen new PCP yet





-Repeat renal function pending at this time.  Patient reports is feeling much 

better and would really like to be discharged home tonight if her renal 

functions are improving however does report that she would be willing to stay 

if appropriate.





Patient seen with Dr. Mann.  Please see attending addendum for further 

assessment and plan and disposition.





Resuscitation Status








VTE Prophylaxis


Will order VTE Prophylaxis:  Yes





Assessment/Plan


IM ATTENDING :





Patient seen and examined. 


history obtained from the patient, , and records.


Preceding documentation by Miss Hope Campa PA-C reviewed.  








In addition, patient refuses to stay for observation/admission for evaluation 

of syncope.








FINAL ASSESSMENT AND RECOMMENDATIONS as follows :





Syncope likely secondary to orthostasis


ARF, clinical dehydration


rule out arrhythmia, obstructive cardiac pathology


given conduction delay (1AVB, RBBB) on EKG and hx nonobstructive CAD as per 

patient history 








PCU


telemetry monitoring


2D echo RE syncope


monitor creatinine to response to IVF


hold home ARB for now.  





Patient's unwillingness to stay for observation relayed to [place], Dr. Drake.


Final patient disposition as per Dr. Drake.











<Electronically signed by Hope Campa PA-C>





  Signed:  08/18/18 0004


  Signed:  





The status of this report is ISigned


* If report status is Draft, the document has not been finalized by the 

responsible provider.  


MNE: [facility] History and Physical Template


<AttendingPhy></AttendingPhy><EDPhy>Félix Drake, </EDPhy> <FamilyPhy>No 

Doctor, Assigned</FamilyPhy> <PrimaryPhy>No Doctor, Assigned</PrimaryPhy><

UnitNumber>[id number]</UnitNumber><VisitNumber>[id number]</VisitNumber><

PatientName>JAZMINE RIZO</PatientName><DateOfBirth>1960</DateOfBirth><Age>58<

/Age><Location>[facility]</Location><ServiceDate>08/18/18</ServiceDate><CC>No Doctor

, Assigned


Dev Mann M.D. Schreckengost, Janea ., SHRUTHI</CC><MNE>ACUTEMED</MNE>

## 2018-08-18 NOTE — DIAGNOSTIC IMAGING REPORT
HEAD WITHOUT CONTRAST (CT)



CLINICAL HISTORY: 58 years-old Female presenting with dizzy/near syncope. 



TECHNIQUE: Multidetector CT imaging of the head was performed without the use of

intravenous contrast. IV contrast: None. A dose lowering technique was used

consistent with the principles of ALARA (as low as reasonably achievable). 



COMPARISON: None.



CT DOSE (mGy.cm): The estimated cumulative dose is 537.48 mGy.cm.



FINDINGS: 



 topogram: Unremarkable.



Ventricles and sulci normal in size. Brain parenchyma normal in appearance with

preserved gray-white differentiation. No mass effect or midline shift. No

hemorrhage or acute territorial infarct. No extra-axial fluid collection.

Paranasal sinuses and mastoid air cells clear. Calvarium intact.    



IMPRESSION:

1.  No acute intracranial abnormality. 







Electronically signed by:  Phil Barnes M.D.

8/18/2018 6:41 PM



Dictated Date/Time:  8/18/2018 6:39 PM